# Patient Record
(demographics unavailable — no encounter records)

---

## 2025-02-27 NOTE — HISTORY OF PRESENT ILLNESS
[FreeTextEntry1] : CPE Establish care [de-identified] : 42-year-old female with a past medical history significant for factor V Leiden carrier, increased risk of breast cancer due to first-degree relative with breast cancer presents for an annual physical.  She denies any acute concerns.  Her dental and vision exams are both up-to-date.  She had a Pap and mammo in November 2024.